# Patient Record
Sex: FEMALE | Race: WHITE | NOT HISPANIC OR LATINO | URBAN - NONMETROPOLITAN AREA
[De-identification: names, ages, dates, MRNs, and addresses within clinical notes are randomized per-mention and may not be internally consistent; named-entity substitution may affect disease eponyms.]

---

## 2021-08-23 ENCOUNTER — OFFICE VISIT (OUTPATIENT)
Dept: URBAN - NONMETROPOLITAN AREA CLINIC 2 | Facility: CLINIC | Age: 76
End: 2021-08-23
Payer: MEDICARE

## 2021-08-23 ENCOUNTER — DASHBOARD ENCOUNTERS (OUTPATIENT)
Age: 76
End: 2021-08-23

## 2021-08-23 ENCOUNTER — WEB ENCOUNTER (OUTPATIENT)
Dept: URBAN - NONMETROPOLITAN AREA CLINIC 2 | Facility: CLINIC | Age: 76
End: 2021-08-23

## 2021-08-23 VITALS
WEIGHT: 108.4 LBS | TEMPERATURE: 97 F | DIASTOLIC BLOOD PRESSURE: 88 MMHG | BODY MASS INDEX: 19.95 KG/M2 | HEIGHT: 62 IN | HEART RATE: 68 BPM | SYSTOLIC BLOOD PRESSURE: 138 MMHG

## 2021-08-23 DIAGNOSIS — K21.9 GERD (GASTROESOPHAGEAL REFLUX DISEASE): ICD-10-CM

## 2021-08-23 DIAGNOSIS — K52.831 COLLAGENOUS COLITIS: ICD-10-CM

## 2021-08-23 DIAGNOSIS — Z12.11 COLON CANCER SCREENING: ICD-10-CM

## 2021-08-23 PROCEDURE — 99213 OFFICE O/P EST LOW 20 MIN: CPT | Performed by: NURSE PRACTITIONER

## 2021-08-23 RX ORDER — LEVOTHYROXINE SODIUM 75 UG/1
TAKE 1 TABLET (75 MCG) BY ORAL ROUTE ONCE DAILY TABLET ORAL 1
Qty: 0 | Refills: 0 | COMMUNITY
Start: 1900-01-01

## 2021-08-23 RX ORDER — MESALAMINE 1.2 G/1
TAKE 2 TABLETS (2.4 GRAM) BY ORAL ROUTE ONCE DAILY WITH A MEAL FOR 90 DAYS TABLET, DELAYED RELEASE ORAL 1
Qty: 180 | Refills: 3
Start: 2020-02-05

## 2021-08-23 NOTE — HPI-TODAY'S VISIT:
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 She was last seen in March 2020. She has been taking lialda 1.2gm daily. She has not needed to take two or imodium. She will take pepto at times which helps but this is more for indigestion. She had recent blood work with Dr Maurice that was normal per her report. Overall, she is feeling well and has no GI complaints. CS

## 2021-08-23 NOTE — HPI-OTHER HISTORIES
History Of Present Illness    Alida returns for follow up  of gastric submucosal nodule initially found in [place]. She had EUS recently that showed a very small nodule in the fundus without concerning properties. I could not perform FNA despite attemtps due to difficult location of this lesion. I planned to see her back to consider repeat imaging or egd/eus. Today she returns fore follow up. Her PCP referred her to [facility] for a second opinion with Dr. Aparicio. He did EGD/EUS as well and also could not FNA this lesion due to position. he felt that this required resection however, and decided to puruse EGD/ESD/EMR (not sure which). She has this schedled for early May and underwent this but had a perforation. She did well after a few days in the icu. Apparrently this lesion was benign. She feels well now. She is still having some loose stools. She denies any blood. She is taking one Lialda  daily.  Today she feels well. She is not having any symptoms at this time.  She is still taking lialda 2.4gm for her microscopic colitis. This works well. She ahs no abd pain or upper GI sx  2/5/20  Mrs Sommers returnsfor follow up forfirst time sine 2018. She reports that she doeshave some mild reflux and dyspahgia. Sheis not taking any meds. She is not having issues with diarrhea. Sheis out of liadla and levsin. She is not having any bloody stools or weightl oss.  3/5/2020 Alida is here for endo f/u and diarrhea. She has an EGD that showed mild gastritis. Today, this is better. After the EGD, she started having diarrhea. She had negative stool studies. She has been taking imodium prn with good result. She has been scared to eat. She had been off the lialda and levsin at her last OV and only recently restarted these. Today, she has felt more normal with no further diarrhea. CS